# Patient Record
(demographics unavailable — no encounter records)

---

## 2024-11-04 NOTE — DISCUSSION/SUMMARY
[FreeTextEntry1] : 91 YO F, Past Smoker with COPD and extensive Cardiac Hx & CHF dc 1 week ago for ARF, tx for Pneumonia & Fluid overload. Completed hospital medications and all symptoms resolved except cough with clear sputum. Pt will continue current therapy with Tessolon Pearles & Robitussion. Pt will submit a sputum culture.  Pt to FU in 3 months or sooner if needed.  Attending Patient doing well.  Admitted to the hospital for combination of CHF possible COPD and pneumonia.  Will continue Tessalon Perles and Robitussin for the cough.  She is coughing up phlegm and we will see if a sputum culture reveals any bacteria. She is using the albuterol 3-4 times a day.  I did discuss with the family that she can use the a for [employer] twice a day and hopefully that will obviate the need to use albuterol and I recommend this course of action. The patient understands and agrees with plan of care. Today's office visit encompassed 32 minutes which excludes teaching and separately reported services.. I conducted an extensive history, physical exam and reviewed diagnosis and treatment options including diagnostic tests,radiology studies including cat scans and the use of prescription medication.

## 2024-11-04 NOTE — HISTORY OF PRESENT ILLNESS
[Former] : former [Never] : never [TextBox_11] : 1 [TextBox_4] : 91 YO F with extensive Cardiac HX, CHF & COPD Went to PCP and had CXR no acute changes no pneumonia dx with bronchitis but went to hospital when cough & swelling increased no fever just cough & mucous stuck in throat, sputum clear but still persists  was in [place], tx for Pneumonia & CHF, fluid overload, was discharged 1 week ago and finished antibiotics Still taking Tessalon and feels it is helping not using Robitussin and continues with nebulizer Seeing PCP & Cardio this week On 11/18/2024 having bladder scraping & has had this procedure in past Sleeping in recliner as being in bed makes her cough more, can't clear the mucous all the time Wears NC O2 2 - 3L @ night and sometimes during the day with activities.  [TextBox_13] : 40 [YearQuit] : 1980

## 2024-11-04 NOTE — REASON FOR VISIT
[Follow-Up - From Hospitalization] : a follow-up visit after a recent hospitalization [COPD] : COPD [Spouse] : spouse [Family Member] : family member [Other: _____] : [unfilled] [TextEntry] : [facility] from [place] 1 week ago Went to PCP and had CXR no acute changes no pneumonia dx with bronchitis no fever just cough & mucous stuck in throat, clear

## 2024-11-04 NOTE — PHYSICAL EXAM
[No Acute Distress] : no acute distress [Normal Oropharynx] : normal oropharynx [Normal Appearance] : normal appearance [No Neck Mass] : no neck mass [Normal Rate/Rhythm] : normal rate/rhythm [Normal S1, S2] : normal s1, s2 [No Resp Distress] : no resp distress [No Abnormalities] : no abnormalities [Benign] : benign [No Clubbing] : no clubbing [No Cyanosis] : no cyanosis [No Edema] : no edema [FROM] : FROM [No Focal Deficits] : no focal deficits [Oriented x3] : oriented x3 [Normal Affect] : normal affect [TextBox_2] : Cachetic [TextBox_11] : full dentures [TextBox_68] : decreased breath sounds, fair effort induced cough [TextBox_99] : minimal weight bearing in WC [TextBox_125] : cool fingers, slight cyanosis